# Patient Record
Sex: MALE | Race: WHITE | NOT HISPANIC OR LATINO | URBAN - METROPOLITAN AREA
[De-identification: names, ages, dates, MRNs, and addresses within clinical notes are randomized per-mention and may not be internally consistent; named-entity substitution may affect disease eponyms.]

---

## 2018-02-21 ENCOUNTER — IMPORTED ENCOUNTER (OUTPATIENT)
Dept: URBAN - METROPOLITAN AREA CLINIC 9 | Facility: CLINIC | Age: 78
End: 2018-02-21

## 2018-03-21 ENCOUNTER — IMPORTED ENCOUNTER (OUTPATIENT)
Dept: URBAN - METROPOLITAN AREA CLINIC 9 | Facility: CLINIC | Age: 78
End: 2018-03-21

## 2018-04-02 ENCOUNTER — IMPORTED ENCOUNTER (OUTPATIENT)
Dept: URBAN - METROPOLITAN AREA CLINIC 9 | Facility: CLINIC | Age: 78
End: 2018-04-02

## 2018-04-27 ENCOUNTER — IMPORTED ENCOUNTER (OUTPATIENT)
Dept: URBAN - METROPOLITAN AREA CLINIC 9 | Facility: CLINIC | Age: 78
End: 2018-04-27

## 2018-05-18 ENCOUNTER — IMPORTED ENCOUNTER (OUTPATIENT)
Dept: URBAN - METROPOLITAN AREA CLINIC 9 | Facility: CLINIC | Age: 78
End: 2018-05-18

## 2018-06-07 ENCOUNTER — IMPORTED ENCOUNTER (OUTPATIENT)
Dept: URBAN - METROPOLITAN AREA CLINIC 9 | Facility: CLINIC | Age: 78
End: 2018-06-07

## 2018-06-13 ENCOUNTER — IMPORTED ENCOUNTER (OUTPATIENT)
Dept: URBAN - METROPOLITAN AREA CLINIC 9 | Facility: CLINIC | Age: 78
End: 2018-06-13

## 2018-06-21 ENCOUNTER — IMPORTED ENCOUNTER (OUTPATIENT)
Dept: URBAN - METROPOLITAN AREA CLINIC 9 | Facility: CLINIC | Age: 78
End: 2018-06-21

## 2018-07-24 ENCOUNTER — IMPORTED ENCOUNTER (OUTPATIENT)
Dept: URBAN - METROPOLITAN AREA CLINIC 9 | Facility: CLINIC | Age: 78
End: 2018-07-24

## 2018-08-31 ENCOUNTER — IMPORTED ENCOUNTER (OUTPATIENT)
Dept: URBAN - METROPOLITAN AREA CLINIC 9 | Facility: CLINIC | Age: 78
End: 2018-08-31

## 2018-10-04 ENCOUNTER — IMPORTED ENCOUNTER (OUTPATIENT)
Dept: URBAN - METROPOLITAN AREA CLINIC 9 | Facility: CLINIC | Age: 78
End: 2018-10-04

## 2019-02-21 ENCOUNTER — IMPORTED ENCOUNTER (OUTPATIENT)
Dept: URBAN - METROPOLITAN AREA CLINIC 9 | Facility: CLINIC | Age: 79
End: 2019-02-21

## 2020-10-19 NOTE — PATIENT DISCUSSION
Take 3,000 mg of oral supplementation of oil, like flaxseed, fish, or [email or web address]  Use microwavable eye mask, those filled with rice may be found on [email or web address].

## 2021-05-13 ENCOUNTER — IMPORTED ENCOUNTER (OUTPATIENT)
Dept: URBAN - METROPOLITAN AREA CLINIC 9 | Facility: CLINIC | Age: 81
End: 2021-05-13

## 2021-08-05 ENCOUNTER — IMPORTED ENCOUNTER (OUTPATIENT)
Dept: URBAN - METROPOLITAN AREA CLINIC 9 | Facility: CLINIC | Age: 81
End: 2021-08-05

## 2021-08-05 PROBLEM — H35.3132: Noted: 2021-08-05

## 2021-08-05 PROBLEM — H34.8322: Noted: 2021-08-05

## 2021-10-16 ASSESSMENT — KERATOMETRY
OS_AXISANGLE_DEGREES: 65
OS_K1POWER_DIOPTERS: 49.5
OS_K2POWER_DIOPTERS: 49.75
OD_K2POWER_DIOPTERS: 50.5
OS_AXISANGLE2_DEGREES: 155
OD_AXISANGLE_DEGREES: 113
OS_K2POWER_DIOPTERS: 48
OS_AXISANGLE2_DEGREES: 61
OD_K2POWER_DIOPTERS: 47
OD_AXISANGLE_DEGREES: 22
OD_AXISANGLE2_DEGREES: 112
OD_K1POWER_DIOPTERS: 50.5
OD_K1POWER_DIOPTERS: 47
OD_AXISANGLE2_DEGREES: 23
OS_K1POWER_DIOPTERS: 47.25
OS_AXISANGLE_DEGREES: 151

## 2021-10-16 ASSESSMENT — VISUAL ACUITY
OS_SC: 20/30 -2 SN
OD_CC: 20/40 + SN
OD_SC: 20/40 SN
OD_SC: 20/200 SN
OD_PH: 20/30 SN
OS_CC: 20/20 SN
OS_SC: 20/25 - SN
OS_SC: 20/25 SN
OD_SC: 20/70 SN
OD_CC: 20/40 SN
OS_SC: 20/50 - SN
OD_CC: 20/60 + SN
OD_PH: 20/70 SN
OD_CC: 20/50 - SN
OS_SC: 20/30 + SN
OS_SC: 20/30 SN
OS_CC: 20/20 SN
OS_CC: 20/30 + SN
OD_SC: 20/30 SN
OD_SC: 20/40 -2 SN
OD_CC: 20/50 SN
OD_CC: 20/20 SN
OS_CC: 20/70 SN
OS_CC: 20/40 SN
OD_CC: 20/25 +2 SN
OD_SC: 20/100 SN
OS_CC: 20/30 SN
OS_CC: 20/40 SN
OD_CC: 20/30 - SN
OD_CC: 20/30 SN
OD_CC: 20/20 SN
OD_PH: 20/30 -2 SN
OD_CC: 20/25 - SN
OD_CC: 20/30 + SN
OD_SC: 20/80 + SN
OS_CC: 20/25 SN
OS_SC: 20/25 -2 SN
OS_CC: 20/30 SN
OS_SC: 20/30 - SN

## 2021-10-16 ASSESSMENT — TONOMETRY
OS_IOP_MMHG: 17
OS_IOP_MMHG: 14
OD_IOP_MMHG: 11
OD_IOP_MMHG: 13
OD_IOP_MMHG: 14
OD_IOP_MMHG: 16
OS_IOP_MMHG: 10
OD_IOP_MMHG: 12
OS_IOP_MMHG: 12
OD_IOP_MMHG: 15
OD_IOP_MMHG: 12
OS_IOP_MMHG: 15
OS_IOP_MMHG: 12
OD_IOP_MMHG: 11
OD_IOP_MMHG: 16
OS_IOP_MMHG: 9
OS_IOP_MMHG: 10

## 2021-11-02 ENCOUNTER — PREPPED CHART (OUTPATIENT)
Dept: URBAN - METROPOLITAN AREA CLINIC 4 | Facility: CLINIC | Age: 81
End: 2021-11-02

## 2021-11-10 ENCOUNTER — ESTABLISHED PATIENT (OUTPATIENT)
Dept: URBAN - METROPOLITAN AREA CLINIC 4 | Facility: CLINIC | Age: 81
End: 2021-11-10

## 2021-11-10 DIAGNOSIS — H34.8322: ICD-10-CM

## 2021-11-10 DIAGNOSIS — H35.3132: ICD-10-CM

## 2021-11-10 PROCEDURE — 92134 CPTRZ OPH DX IMG PST SGM RTA: CPT

## 2021-11-10 PROCEDURE — 92014 COMPRE OPH EXAM EST PT 1/>: CPT

## 2021-11-10 ASSESSMENT — TONOMETRY
OD_IOP_MMHG: 15
OS_IOP_MMHG: 14

## 2021-11-10 ASSESSMENT — VISUAL ACUITY
OD_SC: 20/25-1
OS_SC: 20/40-1

## 2022-04-28 ENCOUNTER — ESTABLISHED PATIENT (OUTPATIENT)
Dept: URBAN - METROPOLITAN AREA CLINIC 4 | Facility: CLINIC | Age: 82
End: 2022-04-28

## 2022-04-28 DIAGNOSIS — H35.3132: ICD-10-CM

## 2022-04-28 DIAGNOSIS — H34.8322: ICD-10-CM

## 2022-04-28 PROCEDURE — 92134 CPTRZ OPH DX IMG PST SGM RTA: CPT

## 2022-04-28 PROCEDURE — 92014 COMPRE OPH EXAM EST PT 1/>: CPT

## 2022-04-28 ASSESSMENT — VISUAL ACUITY
OS_SC: 20/40+1
OD_SC: 20/40

## 2022-04-28 ASSESSMENT — TONOMETRY
OD_IOP_MMHG: 17
OS_IOP_MMHG: 15

## 2022-11-10 ENCOUNTER — ESTABLISHED PATIENT (OUTPATIENT)
Dept: URBAN - METROPOLITAN AREA CLINIC 4 | Facility: CLINIC | Age: 82
End: 2022-11-10

## 2022-11-10 DIAGNOSIS — H35.3132: ICD-10-CM

## 2022-11-10 DIAGNOSIS — H34.8322: ICD-10-CM

## 2022-11-10 PROCEDURE — 92134 CPTRZ OPH DX IMG PST SGM RTA: CPT

## 2022-11-10 PROCEDURE — 92014 COMPRE OPH EXAM EST PT 1/>: CPT

## 2022-11-10 ASSESSMENT — TONOMETRY
OD_IOP_MMHG: 14
OS_IOP_MMHG: 16

## 2022-11-10 ASSESSMENT — VISUAL ACUITY
OD_SC: 20/40
OS_SC: 20/40

## 2023-05-11 ENCOUNTER — ESTABLISHED PATIENT (OUTPATIENT)
Dept: URBAN - METROPOLITAN AREA CLINIC 4 | Facility: CLINIC | Age: 83
End: 2023-05-11

## 2023-05-11 DIAGNOSIS — H34.8322: ICD-10-CM

## 2023-05-11 DIAGNOSIS — H35.3132: ICD-10-CM

## 2023-05-11 PROCEDURE — 92134 CPTRZ OPH DX IMG PST SGM RTA: CPT

## 2023-05-11 PROCEDURE — 92014 COMPRE OPH EXAM EST PT 1/>: CPT

## 2023-05-11 ASSESSMENT — VISUAL ACUITY
OS_SC: 20/40
OU_SC: 20/40
OD_SC: 20/40

## 2023-05-11 ASSESSMENT — TONOMETRY
OS_IOP_MMHG: 16
OD_IOP_MMHG: 16